# Patient Record
Sex: MALE | Race: WHITE
[De-identification: names, ages, dates, MRNs, and addresses within clinical notes are randomized per-mention and may not be internally consistent; named-entity substitution may affect disease eponyms.]

---

## 2019-03-29 ENCOUNTER — HOSPITAL ENCOUNTER (OUTPATIENT)
Dept: HOSPITAL 53 - M WUC | Age: 39
End: 2019-03-29
Attending: PHYSICIAN ASSISTANT
Payer: COMMERCIAL
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
## 2019-03-29 DIAGNOSIS — S50.12XA: Primary | ICD-10-CM
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
## 2019-03-29 NOTE — REP
Clinical:  Trauma/injury.

 

Technique:  AP and lateral views of the left forearm.

 

Findings:

No acute fracture dislocation.  Skeletal structures, joint spaces, and

surrounding soft tissues appear normal.  No subcutaneous emphysema or radiodense

foreign body.

 

Impression:

No acute fracture dislocation.

 

 

Electronically Signed by

Thomas Quintana MD 03/29/2019 05:19 P

## 2019-11-15 ENCOUNTER — HOSPITAL ENCOUNTER (OUTPATIENT)
Dept: HOSPITAL 53 - M LAB LCGH | Age: 39
End: 2019-11-15
Attending: SURGERY

## 2019-11-15 DIAGNOSIS — Z30.2: Primary | ICD-10-CM
